# Patient Record
Sex: MALE | Race: WHITE | NOT HISPANIC OR LATINO | Employment: OTHER | URBAN - METROPOLITAN AREA
[De-identification: names, ages, dates, MRNs, and addresses within clinical notes are randomized per-mention and may not be internally consistent; named-entity substitution may affect disease eponyms.]

---

## 2017-01-01 ENCOUNTER — TELEPHONE (OUTPATIENT)
Dept: INTERNAL MEDICINE | Facility: CLINIC | Age: 72
End: 2017-01-01

## 2017-01-01 ENCOUNTER — OFFICE VISIT (OUTPATIENT)
Dept: INTERNAL MEDICINE | Facility: CLINIC | Age: 72
End: 2017-01-01

## 2017-01-01 ENCOUNTER — HOSPITAL ENCOUNTER (INPATIENT)
Facility: HOSPITAL | Age: 72
LOS: 1 days | Discharge: HOME | End: 2017-04-01
Attending: EMERGENCY MEDICINE | Admitting: HOSPITALIST

## 2017-01-01 ENCOUNTER — HOSPITAL ENCOUNTER (OUTPATIENT)
Dept: CARDIOLOGY | Facility: HOSPITAL | Age: 72
Discharge: HOME | End: 2017-04-04
Admitting: INTERNAL MEDICINE

## 2017-01-01 ENCOUNTER — LAB (OUTPATIENT)
Dept: LAB | Facility: HOSPITAL | Age: 72
End: 2017-01-01

## 2017-01-01 VITALS
HEIGHT: 74 IN | SYSTOLIC BLOOD PRESSURE: 116 MMHG | RESPIRATION RATE: 18 BRPM | OXYGEN SATURATION: 98 % | DIASTOLIC BLOOD PRESSURE: 60 MMHG | WEIGHT: 255 LBS | HEART RATE: 97 BPM | BODY MASS INDEX: 32.73 KG/M2 | TEMPERATURE: 98.5 F

## 2017-01-01 VITALS
OXYGEN SATURATION: 98 % | HEIGHT: 74 IN | SYSTOLIC BLOOD PRESSURE: 122 MMHG | DIASTOLIC BLOOD PRESSURE: 72 MMHG | WEIGHT: 255 LBS | HEART RATE: 91 BPM | BODY MASS INDEX: 32.73 KG/M2 | RESPIRATION RATE: 18 BRPM

## 2017-01-01 VITALS
SYSTOLIC BLOOD PRESSURE: 115 MMHG | HEIGHT: 74 IN | BODY MASS INDEX: 32.71 KG/M2 | RESPIRATION RATE: 18 BRPM | TEMPERATURE: 98 F | HEART RATE: 62 BPM | WEIGHT: 254.9 LBS | DIASTOLIC BLOOD PRESSURE: 75 MMHG | OXYGEN SATURATION: 98 %

## 2017-01-01 DIAGNOSIS — M25.472 LEFT ANKLE SWELLING: ICD-10-CM

## 2017-01-01 DIAGNOSIS — M25.472 LEFT ANKLE SWELLING: Primary | ICD-10-CM

## 2017-01-01 DIAGNOSIS — R50.9 FEVER AND CHILLS: Primary | ICD-10-CM

## 2017-01-01 DIAGNOSIS — L60.8 NAIL PITTING: ICD-10-CM

## 2017-01-01 DIAGNOSIS — I10 ESSENTIAL HYPERTENSION: ICD-10-CM

## 2017-01-01 DIAGNOSIS — Z23 NEED FOR PNEUMOCOCCAL VACCINATION: ICD-10-CM

## 2017-01-01 DIAGNOSIS — R79.89 ELEVATED SERUM CREATININE: ICD-10-CM

## 2017-01-01 DIAGNOSIS — R21 RASH: ICD-10-CM

## 2017-01-01 DIAGNOSIS — D64.9 SYMPTOMATIC ANEMIA: Primary | ICD-10-CM

## 2017-01-01 DIAGNOSIS — M79.605 LEFT LEG PAIN: Primary | ICD-10-CM

## 2017-01-01 DIAGNOSIS — I87.2 VENOUS STASIS DERMATITIS OF BOTH LOWER EXTREMITIES: ICD-10-CM

## 2017-01-01 DIAGNOSIS — K12.1 ULCERATION OF ORAL MUCOSA: ICD-10-CM

## 2017-01-01 DIAGNOSIS — M1A.9XX0 CHRONIC GOUT WITHOUT TOPHUS, UNSPECIFIED CAUSE, UNSPECIFIED SITE: ICD-10-CM

## 2017-01-01 DIAGNOSIS — E79.0 ELEVATED URIC ACID IN BLOOD: ICD-10-CM

## 2017-01-01 DIAGNOSIS — K12.1 ULCERATION, ORAL MUCOSA: ICD-10-CM

## 2017-01-01 DIAGNOSIS — R50.9 FEVER CHILLS: Primary | ICD-10-CM

## 2017-01-01 DIAGNOSIS — Z11.59 NEED FOR HEPATITIS C SCREENING TEST: ICD-10-CM

## 2017-01-01 DIAGNOSIS — M1A.9XX0 CHRONIC GOUT WITHOUT TOPHUS, UNSPECIFIED CAUSE, UNSPECIFIED SITE: Primary | ICD-10-CM

## 2017-01-01 DIAGNOSIS — R73.02 IMPAIRED GLUCOSE TOLERANCE: ICD-10-CM

## 2017-01-01 LAB
ABO + RH BLD: NORMAL
ABO GROUP BLD: NORMAL
ALBUMIN SERPL-MCNC: 3.8 G/DL (ref 3.5–5.2)
ALBUMIN SERPL-MCNC: 4.4 G/DL (ref 3.5–5.2)
ALBUMIN SERPL-MCNC: 4.9 G/DL (ref 3.5–5.2)
ALBUMIN/GLOB SERPL: 1.3 G/DL
ALBUMIN/GLOB SERPL: 1.4 G/DL
ALBUMIN/GLOB SERPL: 2 G/DL
ALP SERPL-CCNC: 65 U/L (ref 39–117)
ALP SERPL-CCNC: 67 U/L (ref 39–117)
ALP SERPL-CCNC: 76 U/L (ref 39–117)
ALT SERPL W P-5'-P-CCNC: 12 U/L (ref 1–41)
ALT SERPL W P-5'-P-CCNC: 8 U/L (ref 1–41)
ALT SERPL-CCNC: 13 U/L (ref 1–41)
ANA SER QL: NEGATIVE
ANION GAP SERPL CALCULATED.3IONS-SCNC: 15.6 MMOL/L
ANION GAP SERPL CALCULATED.3IONS-SCNC: 16.2 MMOL/L
ANISOCYTOSIS BLD QL: ABNORMAL
ANISOCYTOSIS BLD QL: ABNORMAL
ANISOCYTOSIS BLD QL: NORMAL
AST SERPL-CCNC: 12 U/L (ref 1–40)
AST SERPL-CCNC: 13 U/L (ref 1–40)
AST SERPL-CCNC: 16 U/L (ref 1–40)
BACTERIA SPEC AEROBE CULT: NORMAL
BACTERIA SPEC AEROBE CULT: NORMAL
BASOPHILS # BLD AUTO: 0 10*3/MM3 (ref 0–0.2)
BASOPHILS NFR BLD AUTO: 0 % (ref 0–1.5)
BH BB BLOOD EXPIRATION DATE: NORMAL
BH BB BLOOD TYPE BARCODE: 600
BH BB BLOOD TYPE BARCODE: 6200
BH BB DISPENSE STATUS: NORMAL
BH BB PRODUCT CODE: NORMAL
BH BB UNIT NUMBER: NORMAL
BH CV LOWER VASCULAR LEFT COMMON FEMORAL AUGMENT: NORMAL
BH CV LOWER VASCULAR LEFT COMMON FEMORAL COMPETENT: NORMAL
BH CV LOWER VASCULAR LEFT COMMON FEMORAL COMPRESS: NORMAL
BH CV LOWER VASCULAR LEFT COMMON FEMORAL PHASIC: NORMAL
BH CV LOWER VASCULAR LEFT COMMON FEMORAL SPONT: NORMAL
BH CV LOWER VASCULAR LEFT DISTAL FEMORAL COMPRESS: NORMAL
BH CV LOWER VASCULAR LEFT GASTRONEMIUS COMPRESS: NORMAL
BH CV LOWER VASCULAR LEFT GREATER SAPH AK COMPRESS: NORMAL
BH CV LOWER VASCULAR LEFT GREATER SAPH BK COMPRESS: NORMAL
BH CV LOWER VASCULAR LEFT MID FEMORAL AUGMENT: NORMAL
BH CV LOWER VASCULAR LEFT MID FEMORAL COMPETENT: NORMAL
BH CV LOWER VASCULAR LEFT MID FEMORAL COMPRESS: NORMAL
BH CV LOWER VASCULAR LEFT MID FEMORAL PHASIC: NORMAL
BH CV LOWER VASCULAR LEFT MID FEMORAL SPONT: NORMAL
BH CV LOWER VASCULAR LEFT PERONEAL COMPRESS: NORMAL
BH CV LOWER VASCULAR LEFT POPLITEAL AUGMENT: NORMAL
BH CV LOWER VASCULAR LEFT POPLITEAL COMPETENT: NORMAL
BH CV LOWER VASCULAR LEFT POPLITEAL COMPRESS: NORMAL
BH CV LOWER VASCULAR LEFT POPLITEAL PHASIC: NORMAL
BH CV LOWER VASCULAR LEFT POPLITEAL SPONT: NORMAL
BH CV LOWER VASCULAR LEFT POSTERIOR TIBIAL COMPRESS: NORMAL
BH CV LOWER VASCULAR LEFT PROXIMAL FEMORAL COMPRESS: NORMAL
BH CV LOWER VASCULAR LEFT SAPHENOFEMORAL JUNCTION AUGMENT: NORMAL
BH CV LOWER VASCULAR LEFT SAPHENOFEMORAL JUNCTION COMPETENT: NORMAL
BH CV LOWER VASCULAR LEFT SAPHENOFEMORAL JUNCTION COMPRESS: NORMAL
BH CV LOWER VASCULAR LEFT SAPHENOFEMORAL JUNCTION PHASIC: NORMAL
BH CV LOWER VASCULAR LEFT SAPHENOFEMORAL JUNCTION SPONT: NORMAL
BH CV LOWER VASCULAR RIGHT COMMON FEMORAL AUGMENT: NORMAL
BH CV LOWER VASCULAR RIGHT COMMON FEMORAL COMPETENT: NORMAL
BH CV LOWER VASCULAR RIGHT COMMON FEMORAL COMPRESS: NORMAL
BH CV LOWER VASCULAR RIGHT COMMON FEMORAL PHASIC: NORMAL
BH CV LOWER VASCULAR RIGHT COMMON FEMORAL SPONT: NORMAL
BH CV POP FLUID COLLECT LEFT: 1
BILIRUB SERPL-MCNC: 0.4 MG/DL (ref 0.1–1.2)
BILIRUB SERPL-MCNC: 0.5 MG/DL (ref 0.1–1.2)
BILIRUB SERPL-MCNC: 0.6 MG/DL (ref 0.1–1.2)
BILIRUB UR QL STRIP: NEGATIVE
BLASTS NFR BLD MANUAL: 2 % (ref 0–0)
BLASTS NFR BLD MANUAL: 3 % (ref 0–0)
BLD GP AB SCN SERPL QL: NEGATIVE
BUN BLD-MCNC: 16 MG/DL (ref 8–23)
BUN BLD-MCNC: 17 MG/DL (ref 8–23)
BUN SERPL-MCNC: 15 MG/DL (ref 8–23)
BUN SERPL-MCNC: 22 MG/DL (ref 8–23)
BUN/CREAT SERPL: 12.5 (ref 7–25)
BUN/CREAT SERPL: 12.8 (ref 7–25)
BUN/CREAT SERPL: 13.7 (ref 7–25)
BUN/CREAT SERPL: 16.7 (ref 7–25)
C3 SERPL-MCNC: 158 MG/DL (ref 82–167)
C4 SERPL-MCNC: 37 MG/DL (ref 14–44)
CALCIUM SERPL-MCNC: 9.6 MG/DL (ref 8.6–10.5)
CALCIUM SERPL-MCNC: 9.7 MG/DL (ref 8.6–10.5)
CALCIUM SPEC-SCNC: 8.8 MG/DL (ref 8.6–10.5)
CALCIUM SPEC-SCNC: 9.4 MG/DL (ref 8.6–10.5)
CHLORIDE SERPL-SCNC: 100 MMOL/L (ref 98–107)
CHLORIDE SERPL-SCNC: 100 MMOL/L (ref 98–107)
CHLORIDE SERPL-SCNC: 103 MMOL/L (ref 98–107)
CHLORIDE SERPL-SCNC: 103 MMOL/L (ref 98–107)
CHOLEST SERPL-MCNC: 131 MG/DL (ref 0–200)
CHROMATIN AB SERPL-ACNC: <10 IU/ML (ref 0–14)
CLARITY UR: CLEAR
CO2 SERPL-SCNC: 21.4 MMOL/L (ref 22–29)
CO2 SERPL-SCNC: 21.6 MMOL/L (ref 22–29)
CO2 SERPL-SCNC: 22.6 MMOL/L (ref 22–29)
CO2 SERPL-SCNC: 22.8 MMOL/L (ref 22–29)
COLOR UR: YELLOW
CREAT BLD-MCNC: 1.24 MG/DL (ref 0.76–1.27)
CREAT BLD-MCNC: 1.25 MG/DL (ref 0.76–1.27)
CREAT SERPL-MCNC: 1.2 MG/DL (ref 0.76–1.27)
CREAT SERPL-MCNC: 1.32 MG/DL (ref 0.76–1.27)
CRP SERPL-MCNC: 2.04 MG/DL (ref 0–0.5)
DACRYOCYTES BLD QL SMEAR: ABNORMAL
DAT POLY-SP REAG RBC QL: NEGATIVE
DEPRECATED RDW RBC AUTO: 80.5 FL (ref 37–54)
DEPRECATED RDW RBC AUTO: 80.6 FL (ref 37–54)
DEPRECATED RDW RBC AUTO: 92.4 FL (ref 37–54)
EOSINOPHIL # BLD AUTO: 0 10*3/MM3 (ref 0–0.7)
EOSINOPHIL # BLD MANUAL: 0.06 10*3/MM3 (ref 0–0.7)
EOSINOPHIL # BLD MANUAL: 0.07 10*3/MM3 (ref 0–0.7)
EOSINOPHIL NFR BLD AUTO: 0 % (ref 0.3–6.2)
EOSINOPHIL NFR BLD MANUAL: 1 % (ref 0.3–6.2)
EOSINOPHIL NFR BLD MANUAL: 1 % (ref 0.3–6.2)
ERYTHROCYTE [DISTWIDTH] IN BLOOD BY AUTOMATED COUNT: 20.1 % (ref 11.5–14.5)
ERYTHROCYTE [DISTWIDTH] IN BLOOD BY AUTOMATED COUNT: 20.2 % (ref 11.5–14.5)
ERYTHROCYTE [DISTWIDTH] IN BLOOD BY AUTOMATED COUNT: 24.4 % (ref 11.5–14.5)
ERYTHROCYTE [SEDIMENTATION RATE] IN BLOOD: >140 MM/HR (ref 0–20)
ERYTHROCYTE [SEDIMENTATION RATE] IN BLOOD: >140 MM/HR (ref 0–20)
FERRITIN SERPL-MCNC: 683.1 NG/ML (ref 30–400)
FOLATE SERPL-MCNC: 16.25 NG/ML (ref 4.78–24.2)
GFR SERPL CREATININE-BSD FRML MDRD: 57 ML/MIN/1.73
GFR SERPL CREATININE-BSD FRML MDRD: 57 ML/MIN/1.73
GLOBULIN SER CALC-MCNC: 2.5 GM/DL
GLOBULIN UR ELPH-MCNC: 2.8 GM/DL
GLOBULIN UR ELPH-MCNC: 3.5 GM/DL
GLUCOSE BLD-MCNC: 104 MG/DL (ref 65–99)
GLUCOSE BLD-MCNC: 95 MG/DL (ref 65–99)
GLUCOSE SERPL-MCNC: 128 MG/DL (ref 65–99)
GLUCOSE SERPL-MCNC: 85 MG/DL (ref 65–99)
GLUCOSE UR STRIP-MCNC: NEGATIVE MG/DL
HAPTOGLOB SERPL-MCNC: 313 MG/DL (ref 34–200)
HBA1C MFR BLD: 6.1 % (ref 4.8–5.6)
HCT VFR BLD AUTO: (no result) %
HCT VFR BLD AUTO: 17.3 % (ref 40.4–52.2)
HCT VFR BLD AUTO: 18.6 % (ref 40.4–52.2)
HCT VFR BLD AUTO: 20 % (ref 40.4–52.2)
HCT VFR BLD AUTO: 25.8 % (ref 40.4–52.2)
HCV AB S/CO SERPL IA: <0.1 S/CO RATIO (ref 0–0.9)
HDLC SERPL-MCNC: 30 MG/DL (ref 40–60)
HGB BLD-MCNC: (no result) G/DL
HGB BLD-MCNC: 5.7 G/DL (ref 13.7–17.6)
HGB BLD-MCNC: 6 G/DL (ref 13.7–17.6)
HGB BLD-MCNC: 6.5 G/DL (ref 13.7–17.6)
HGB BLD-MCNC: 8.8 G/DL (ref 13.7–17.6)
HGB UR QL STRIP.AUTO: NEGATIVE
HOWELL-JOLLY BOD BLD QL SMEAR: PRESENT
HOWELL-JOLLY BOD BLD QL SMEAR: PRESENT
HYPOCHROMIA BLD QL: ABNORMAL
IMM GRANULOCYTES # BLD: 0.02 10*3/MM3 (ref 0–0.03)
IMM GRANULOCYTES NFR BLD: 0.4 % (ref 0–0.5)
IRON 24H UR-MRATE: 103 MCG/DL (ref 59–158)
IRON SATN MFR SERPL: 31 % (ref 20–50)
KETONES UR QL STRIP: NEGATIVE
LARGE PLATELETS: ABNORMAL
LDH SERPL-CCNC: 467 U/L (ref 135–225)
LDLC SERPL CALC-MCNC: 78 MG/DL (ref 0–100)
LEUKOCYTE ESTERASE UR QL STRIP.AUTO: NEGATIVE
LYMPHOCYTES # BLD AUTO: 1.39 10*3/MM3 (ref 0.9–4.8)
LYMPHOCYTES # BLD MANUAL: 0.99 10*3/MM3 (ref 0.9–4.8)
LYMPHOCYTES # BLD MANUAL: 1.48 10*3/MM3 (ref 0.9–4.8)
LYMPHOCYTES NFR BLD AUTO: 30.8 % (ref 19.6–45.3)
LYMPHOCYTES NFR BLD MANUAL: 16 % (ref 19.6–45.3)
LYMPHOCYTES NFR BLD MANUAL: 20 % (ref 19.6–45.3)
LYMPHOCYTES NFR BLD MANUAL: 46 % (ref 5–12)
LYMPHOCYTES NFR BLD MANUAL: 53 % (ref 5–12)
Lab: NORMAL
MACROCYTES BLD QL SMEAR: NORMAL
MCH RBC QN AUTO: 34.8 PG (ref 27–32.7)
MCH RBC QN AUTO: 35.7 PG (ref 27–32.7)
MCH RBC QN AUTO: 36.3 PG (ref 27–32.7)
MCHC RBC AUTO-ENTMCNC: 32.3 G/DL (ref 32.6–36.4)
MCHC RBC AUTO-ENTMCNC: 32.5 G/DL (ref 32.6–36.4)
MCHC RBC AUTO-ENTMCNC: 32.9 G/DL (ref 32.6–36.4)
MCV RBC AUTO: 107 FL (ref 79.8–96.2)
MCV RBC AUTO: 110.2 FL (ref 79.8–96.2)
MCV RBC AUTO: 110.7 FL (ref 79.8–96.2)
MONOCYTES # BLD AUTO: 2.6 10*3/MM3 (ref 0.2–1.2)
MONOCYTES # BLD AUTO: 3.29 10*3/MM3 (ref 0.2–1.2)
MONOCYTES # BLD AUTO: 3.4 10*3/MM3 (ref 0.2–1.2)
MONOCYTES NFR BLD AUTO: 57.6 % (ref 5–12)
MYELOCYTES NFR BLD MANUAL: 1 % (ref 0–0)
NEUTROPHILS # BLD AUTO: 0.5 10*3/MM3 (ref 1.9–8.1)
NEUTROPHILS # BLD AUTO: 1.61 10*3/MM3 (ref 1.9–8.1)
NEUTROPHILS # BLD AUTO: 2.22 10*3/MM3 (ref 1.9–8.1)
NEUTROPHILS NFR BLD AUTO: 11.2 % (ref 42.7–76)
NEUTROPHILS NFR BLD MANUAL: 26 % (ref 42.7–76)
NEUTROPHILS NFR BLD MANUAL: 30 % (ref 42.7–76)
NITRITE UR QL STRIP: NEGATIVE
OVALOCYTES BLD QL SMEAR: ABNORMAL
PH UR STRIP.AUTO: <=5 [PH] (ref 5–8)
PLAT MORPH BLD: NORMAL
PLAT MORPH BLD: NORMAL
PLATELET # BLD AUTO: (no result) 10*3/UL
PLATELET # BLD AUTO: 73 10*3/MM3 (ref 140–500)
PLATELET # BLD AUTO: 85 10*3/MM3 (ref 140–500)
PLATELET # BLD AUTO: 98 10*3/MM3 (ref 140–500)
PMV BLD AUTO: 11.3 FL (ref 6–12)
PMV BLD AUTO: 11.6 FL (ref 6–12)
PMV BLD AUTO: 11.6 FL (ref 6–12)
POLYCHROMASIA BLD QL SMEAR: ABNORMAL
POLYCHROMASIA BLD QL SMEAR: ABNORMAL
POTASSIUM BLD-SCNC: 4.2 MMOL/L (ref 3.5–5.2)
POTASSIUM BLD-SCNC: 4.3 MMOL/L (ref 3.5–5.2)
POTASSIUM SERPL-SCNC: 4.5 MMOL/L (ref 3.5–5.2)
POTASSIUM SERPL-SCNC: 4.6 MMOL/L (ref 3.5–5.2)
PROT SERPL-MCNC: 6.6 G/DL (ref 6–8.5)
PROT SERPL-MCNC: 7.4 G/DL (ref 6–8.5)
PROT SERPL-MCNC: 7.9 G/DL (ref 6–8.5)
PROT UR QL STRIP: NEGATIVE
RBC # BLD AUTO: (no result) 10*6/UL
RBC # BLD AUTO: 1.57 10*6/MM3 (ref 4.6–6)
RBC # BLD AUTO: 1.68 10*6/MM3 (ref 4.6–6)
RBC # BLD AUTO: 1.87 10*6/MM3 (ref 4.6–6)
REQUEST PROBLEM: NORMAL
RETICS/RBC NFR AUTO: 3.27 % (ref 0.5–1.5)
RH BLD: POSITIVE
SCAN SLIDE: NORMAL
SCAN SLIDE: NORMAL
SODIUM BLD-SCNC: 139 MMOL/L (ref 136–145)
SODIUM BLD-SCNC: 140 MMOL/L (ref 136–145)
SODIUM SERPL-SCNC: 140 MMOL/L (ref 136–145)
SODIUM SERPL-SCNC: 141 MMOL/L (ref 136–145)
SP GR UR STRIP: 1.02 (ref 1–1.03)
SPHEROCYTES BLD QL SMEAR: ABNORMAL
SPHEROCYTES BLD QL SMEAR: ABNORMAL
STOMATOCYTES BLD QL SMEAR: ABNORMAL
TIBC SERPL-MCNC: 334 MCG/DL (ref 298–536)
TRANSFERRIN SERPL-MCNC: 224 MG/DL (ref 200–360)
TRIGL SERPL-MCNC: 115 MG/DL (ref 0–150)
TSH SERPL DL<=0.05 MIU/L-ACNC: 2.78 MIU/ML (ref 0.27–4.2)
TSH SERPL DL<=0.05 MIU/L-ACNC: 3.39 MIU/ML (ref 0.27–4.2)
UNIT  ABO: NORMAL
UNIT  RH: NORMAL
URATE SERPL-MCNC: 5.7 MG/DL (ref 3.4–7)
URATE SERPL-MCNC: 6.8 MG/DL (ref 3.4–7)
URATE SERPL-MCNC: 8.3 MG/DL (ref 3.4–7)
UROBILINOGEN UR QL STRIP: NORMAL
VARIANT LYMPHS NFR BLD MANUAL: 1 % (ref 0–5)
VIT B12 BLD-MCNC: 328 PG/ML (ref 211–946)
VLDLC SERPL CALC-MCNC: 23 MG/DL (ref 5–40)
WBC # BLD AUTO: (no result) 10*3/MM3
WBC MORPH BLD: NORMAL
WBC NRBC COR # BLD: 4.51 10*3/MM3 (ref 4.5–10.7)
WBC NRBC COR # BLD: 6.2 10*3/MM3 (ref 4.5–10.7)
WBC NRBC COR # BLD: 7.4 10*3/MM3 (ref 4.5–10.7)

## 2017-01-01 PROCEDURE — 85007 BL SMEAR W/DIFF WBC COUNT: CPT | Performed by: EMERGENCY MEDICINE

## 2017-01-01 PROCEDURE — 83010 ASSAY OF HAPTOGLOBIN QUANT: CPT | Performed by: INTERNAL MEDICINE

## 2017-01-01 PROCEDURE — 86923 COMPATIBILITY TEST ELECTRIC: CPT

## 2017-01-01 PROCEDURE — 99214 OFFICE O/P EST MOD 30 MIN: CPT | Performed by: INTERNAL MEDICINE

## 2017-01-01 PROCEDURE — 36430 TRANSFUSION BLD/BLD COMPNT: CPT

## 2017-01-01 PROCEDURE — 81003 URINALYSIS AUTO W/O SCOPE: CPT | Performed by: HOSPITALIST

## 2017-01-01 PROCEDURE — 85045 AUTOMATED RETICULOCYTE COUNT: CPT | Performed by: HOSPITALIST

## 2017-01-01 PROCEDURE — 85018 HEMOGLOBIN: CPT | Performed by: INTERNAL MEDICINE

## 2017-01-01 PROCEDURE — 82607 VITAMIN B-12: CPT | Performed by: HOSPITALIST

## 2017-01-01 PROCEDURE — 85652 RBC SED RATE AUTOMATED: CPT | Performed by: HOSPITALIST

## 2017-01-01 PROCEDURE — 85025 COMPLETE CBC W/AUTO DIFF WBC: CPT | Performed by: HOSPITALIST

## 2017-01-01 PROCEDURE — P9016 RBC LEUKOCYTES REDUCED: HCPCS

## 2017-01-01 PROCEDURE — 30233N1 TRANSFUSION OF NONAUTOLOGOUS RED BLOOD CELLS INTO PERIPHERAL VEIN, PERCUTANEOUS APPROACH: ICD-10-PCS | Performed by: INTERNAL MEDICINE

## 2017-01-01 PROCEDURE — 86900 BLOOD TYPING SEROLOGIC ABO: CPT

## 2017-01-01 PROCEDURE — 84466 ASSAY OF TRANSFERRIN: CPT | Performed by: EMERGENCY MEDICINE

## 2017-01-01 PROCEDURE — 86850 RBC ANTIBODY SCREEN: CPT | Performed by: EMERGENCY MEDICINE

## 2017-01-01 PROCEDURE — 93971 EXTREMITY STUDY: CPT

## 2017-01-01 PROCEDURE — 83540 ASSAY OF IRON: CPT | Performed by: EMERGENCY MEDICINE

## 2017-01-01 PROCEDURE — 90732 PPSV23 VACC 2 YRS+ SUBQ/IM: CPT | Performed by: INTERNAL MEDICINE

## 2017-01-01 PROCEDURE — 30233N1 TRANSFUSION OF NONAUTOLOGOUS RED BLOOD CELLS INTO PERIPHERAL VEIN, PERCUTANEOUS APPROACH: ICD-10-PCS | Performed by: EMERGENCY MEDICINE

## 2017-01-01 PROCEDURE — 99284 EMERGENCY DEPT VISIT MOD MDM: CPT

## 2017-01-01 PROCEDURE — 85014 HEMATOCRIT: CPT | Performed by: INTERNAL MEDICINE

## 2017-01-01 PROCEDURE — 85007 BL SMEAR W/DIFF WBC COUNT: CPT | Performed by: HOSPITALIST

## 2017-01-01 PROCEDURE — 84443 ASSAY THYROID STIM HORMONE: CPT | Performed by: HOSPITALIST

## 2017-01-01 PROCEDURE — 82728 ASSAY OF FERRITIN: CPT | Performed by: EMERGENCY MEDICINE

## 2017-01-01 PROCEDURE — 82746 ASSAY OF FOLIC ACID SERUM: CPT | Performed by: HOSPITALIST

## 2017-01-01 PROCEDURE — 86900 BLOOD TYPING SEROLOGIC ABO: CPT | Performed by: EMERGENCY MEDICINE

## 2017-01-01 PROCEDURE — 86901 BLOOD TYPING SEROLOGIC RH(D): CPT | Performed by: EMERGENCY MEDICINE

## 2017-01-01 PROCEDURE — 83615 LACTATE (LD) (LDH) ENZYME: CPT | Performed by: HOSPITALIST

## 2017-01-01 PROCEDURE — G0009 ADMIN PNEUMOCOCCAL VACCINE: HCPCS | Performed by: INTERNAL MEDICINE

## 2017-01-01 PROCEDURE — 86901 BLOOD TYPING SEROLOGIC RH(D): CPT

## 2017-01-01 PROCEDURE — 86880 COOMBS TEST DIRECT: CPT | Performed by: INTERNAL MEDICINE

## 2017-01-01 PROCEDURE — 80053 COMPREHEN METABOLIC PANEL: CPT | Performed by: HOSPITALIST

## 2017-01-01 PROCEDURE — 85025 COMPLETE CBC W/AUTO DIFF WBC: CPT | Performed by: EMERGENCY MEDICINE

## 2017-01-01 PROCEDURE — 99223 1ST HOSP IP/OBS HIGH 75: CPT | Performed by: INTERNAL MEDICINE

## 2017-01-01 RX ORDER — ONDANSETRON 4 MG/1
4 TABLET, ORALLY DISINTEGRATING ORAL EVERY 6 HOURS PRN
Status: DISCONTINUED | OUTPATIENT
Start: 2017-01-01 | End: 2017-01-01 | Stop reason: HOSPADM

## 2017-01-01 RX ORDER — ALLOPURINOL 100 MG/1
100 TABLET ORAL DAILY
Status: DISCONTINUED | OUTPATIENT
Start: 2017-01-01 | End: 2017-01-01 | Stop reason: HOSPADM

## 2017-01-01 RX ORDER — ONDANSETRON 4 MG/1
4 TABLET, FILM COATED ORAL EVERY 6 HOURS PRN
Status: DISCONTINUED | OUTPATIENT
Start: 2017-01-01 | End: 2017-01-01 | Stop reason: HOSPADM

## 2017-01-01 RX ORDER — SODIUM CHLORIDE 0.9 % (FLUSH) 0.9 %
1-10 SYRINGE (ML) INJECTION AS NEEDED
Status: DISCONTINUED | OUTPATIENT
Start: 2017-01-01 | End: 2017-01-01 | Stop reason: HOSPADM

## 2017-01-01 RX ORDER — ACETAMINOPHEN 325 MG/1
650 TABLET ORAL EVERY 4 HOURS PRN
Status: DISCONTINUED | OUTPATIENT
Start: 2017-01-01 | End: 2017-01-01 | Stop reason: HOSPADM

## 2017-01-01 RX ORDER — INDOMETHACIN 50 MG/1
50 CAPSULE ORAL 3 TIMES DAILY PRN
Qty: 15 CAPSULE | Refills: 0 | Status: ON HOLD | OUTPATIENT
Start: 2017-01-01 | End: 2017-01-01

## 2017-01-01 RX ORDER — PANTOPRAZOLE SODIUM 40 MG/10ML
40 INJECTION, POWDER, LYOPHILIZED, FOR SOLUTION INTRAVENOUS EVERY 12 HOURS SCHEDULED
Status: DISCONTINUED | OUTPATIENT
Start: 2017-01-01 | End: 2017-01-01 | Stop reason: HOSPADM

## 2017-01-01 RX ORDER — SODIUM CHLORIDE 0.9 % (FLUSH) 0.9 %
10 SYRINGE (ML) INJECTION AS NEEDED
Status: DISCONTINUED | OUTPATIENT
Start: 2017-01-01 | End: 2017-01-01 | Stop reason: HOSPADM

## 2017-01-01 RX ORDER — LOSARTAN POTASSIUM 50 MG/1
50 TABLET ORAL DAILY
Status: DISCONTINUED | OUTPATIENT
Start: 2017-01-01 | End: 2017-01-01 | Stop reason: HOSPADM

## 2017-01-01 RX ORDER — MELATONIN
1000 DAILY
Status: DISCONTINUED | OUTPATIENT
Start: 2017-01-01 | End: 2017-01-01 | Stop reason: HOSPADM

## 2017-01-01 RX ORDER — ONDANSETRON 2 MG/ML
4 INJECTION INTRAMUSCULAR; INTRAVENOUS EVERY 6 HOURS PRN
Status: DISCONTINUED | OUTPATIENT
Start: 2017-01-01 | End: 2017-01-01 | Stop reason: HOSPADM

## 2017-01-01 RX ORDER — ALLOPURINOL 100 MG/1
200 TABLET ORAL DAILY
Status: DISCONTINUED | OUTPATIENT
Start: 2017-01-01 | End: 2017-01-01

## 2017-01-01 RX ORDER — ALLOPURINOL 100 MG/1
200 TABLET ORAL DAILY
Qty: 60 TABLET | Refills: 1 | Status: SHIPPED | OUTPATIENT
Start: 2017-01-01

## 2017-01-01 RX ADMIN — ALLOPURINOL 100 MG: 100 TABLET ORAL at 10:10

## 2017-01-01 RX ADMIN — VITAMIN D, TAB 1000IU (100/BT) 1000 UNITS: 25 TAB at 10:09

## 2017-01-01 RX ADMIN — LOSARTAN POTASSIUM 50 MG: 50 TABLET, FILM COATED ORAL at 10:08

## 2017-01-01 RX ADMIN — PANTOPRAZOLE SODIUM 40 MG: 40 INJECTION, POWDER, FOR SOLUTION INTRAVENOUS at 03:22

## 2017-01-04 NOTE — TELEPHONE ENCOUNTER
Is taking the allopurinol, but had a lot of seafood over thanksgiving and emily, because his family doesn't eat turkey, chicken, or beef. Did not know that would cause his gout to act up.     Pt wants to know if we can send in something extra to help with the flare up he is having.

## 2017-01-04 NOTE — TELEPHONE ENCOUNTER
Diets high in animal protein can increase gout.  Will send in indomethacin.      He is overdue for labs and follow up.

## 2017-01-04 NOTE — TELEPHONE ENCOUNTER
Called and informed pt. Told me he goes to a different [facility], called pharmacy and took care of that. Changed pharmacy in pt's chart.     Also informed pt that a f/u with labs needed to be scheduled since he is way past due. Pt stated that they would call back and schedule this.

## 2017-02-13 NOTE — PROGRESS NOTES
Chief Complaint  Remy Bryant is a 71 y.o. male who presents for Ankle Pain (Left ankle)  .    History of Present Illness   His left calf and his ankle started hurting 2-3 days after goldy dinner.  He had eaten a very rich Goldy dinner and he was wondering if this was related to his gout.  He didn't have any known injury to his ankle.  No long car or plane rides.  He told people he twisted it because he didn't want to own up to having gout.    We had called in indomethacin and it helped within the first 24 hours.  He stays very active for his age.  He feels like the ankle is almost 100% but still has some residual ache.      HTN - no cp or palp or edema or dizziness.     PreDM - he is overdue for labs.      Review of Systems   Constitutional: Negative for chills, fatigue and fever.   Musculoskeletal: Positive for gait problem (only when his ankle bothers him). Negative for arthralgias, joint swelling and myalgias.   Skin: Negative for color change and rash.   Neurological: Negative for dizziness and light-headedness.       Patient Active Problem List   Diagnosis   • Increased glucose level   • Gout   • Hypertension   • Shoulder joint pain   • Impaired glucose tolerance       Past Medical History   Diagnosis Date   • Disease of thyroid gland    • Gout    • Hypertension    • IFG (impaired fasting glucose)    • Nephrolithiasis    • Obesity    • Polyp of sigmoid colon    • Prediabetes    • Renal insufficiency syndrome        Past Surgical History   Procedure Laterality Date   • Appendectomy     • Eye surgery     • Total hip arthroplasty         Family History   Problem Relation Age of Onset   • Coronary artery disease Mother    • Stroke Mother    • Heart attack Father    • Asthma Sister    • Diabetes Paternal Grandmother        Social History     Social History   • Marital status:      Spouse name: N/A   • Number of children: N/A   • Years of education: N/A     Occupational History   • Not on file.  "    Social History Main Topics   • Smoking status: Never Smoker   • Smokeless tobacco: Not on file   • Alcohol use Yes   • Drug use: No   • Sexual activity: Not on file     Other Topics Concern   • Not on file     Social History Narrative       Current Outpatient Prescriptions on File Prior to Visit   Medication Sig Dispense Refill   • allopurinol (ZYLOPRIM) 100 MG tablet Take 1 tablet by mouth Daily. 90 tablet 2   • indomethacin (INDOCIN) 50 MG capsule Take 1 capsule by mouth 3 (Three) Times a Day As Needed for mild pain (1-3). 15 capsule 0   • losartan (COZAAR) 50 MG tablet Take 1 tablet by mouth Daily. Indications: Was sent in via EHR 90 tablet 3     No current facility-administered medications on file prior to visit.        No Known Allergies    Vitals:    02/13/17 1122   BP: 122/72   Pulse: 91   Resp: 18   SpO2: 98%   Weight: 255 lb (116 kg)   Height: 74\" (188 cm)       Body mass index is 32.74 kg/(m^2).    Objective   Physical Exam   Constitutional: He is oriented to person, place, and time. He appears well-developed and well-nourished. No distress.   HENT:   Head: Normocephalic and atraumatic.   Eyes: Conjunctivae are normal. No scleral icterus.   Neck: Normal range of motion. Neck supple.   Cardiovascular: Normal rate, regular rhythm and normal heart sounds.  Exam reveals no gallop and no friction rub.    No murmur heard.  Pulmonary/Chest: Effort normal and breath sounds normal. No respiratory distress. He has no wheezes. He has no rales.   Musculoskeletal: Normal range of motion. He exhibits no edema.        Left ankle: He exhibits normal range of motion, no swelling and no deformity. No tenderness. Achilles tendon exhibits no pain.        Left lower leg: He exhibits no tenderness, no bony tenderness, no swelling and no edema.   Lymphadenopathy:     He has no cervical adenopathy.   Neurological: He is alert and oriented to person, place, and time. No cranial nerve deficit.   Skin: No erythema (left calf). "   Psychiatric: He has a normal mood and affect. His behavior is normal. Judgment and thought content normal.       Results for orders placed or performed in visit on 06/01/15   CBC and Differential   Result Value Ref Range    WBC 5.1 3.4 - 10.8 x10E3/uL    RBC 5.25 4.14 - 5.80 x10E6/uL    Hemoglobin 14.5 12.6 - 17.7 g/dL    Hematocrit 42.5 37.5 - 51.0 %    MCV 81 79 - 97 fL    MCH 27.6 26.6 - 33.0 pg    MCHC 34.1 31.5 - 35.7 g/dL    RDW 13.6 12.3 - 15.4 %    Platelets 196 150 - 379 x10E3/uL    Neutrophil Rel % 58 %    Lymphocyte Rel % 30 %    Monocyte Rel % 8 %    Eosinophil Rel % 3 %    Basophil Rel % 1 %    Immature Cells CANCELED     Neutrophils Absolute 3.0 1.4 - 7.0 x10E3/uL    Lymphocytes Absolute 1.6 0.7 - 3.1 x10E3/uL    Monocytes Absolute 0.4 0.1 - 0.9 x10E3/uL    Eosinophils Absolute 0.1 0.0 - 0.4 x10E3/uL    Basophils Absolute 0.1 0.0 - 0.2 x10E3/uL    Immature Granulocyte Rel % 0 %    Immature Grans Absolute 0.0 0.0 - 0.1 x10E3/uL    nRBC CANCELED     Hematology Comments: CANCELED    Comprehensive metabolic panel   Result Value Ref Range    Glucose 120 (H) 65 - 99 mg/dL    BUN 16 8 - 27 mg/dL    Creatinine 1.05 0.76 - 1.27 mg/dL    eGFR Non African Am 72 >59 mL/min/1.73    eGFR African Am 83 >59 mL/min/1.73    BUN/Creatinine Ratio 15 10 - 22    Sodium 139 134 - 144 mmol/L    Potassium 4.7 3.5 - 5.2 mmol/L    Chloride 101 97 - 108 mmol/L    Total CO2 22 18 - 29 mmol/L    Calcium 9.5 8.6 - 10.2 mg/dL    Total Protein 6.8 6.0 - 8.5 g/dL    Albumin 4.6 3.6 - 4.8 g/dL    Globulin 2.2 1.5 - 4.5 g/dL    A/G Ratio 2.1 1.1 - 2.5    Total Bilirubin 0.4 0.0 - 1.2 mg/dL    Alkaline Phosphatase 72 39 - 117 IU/L    AST (SGOT) 17 0 - 40 IU/L    ALT (SGPT) 23 0 - 44 IU/L   UA/M w/rflx Culture, Routine   Result Value Ref Range    Specific Gravity, UA 1.020 1.005 - 1.030    pH, UA 6.0 5.0 - 7.5    Color, UA Yellow Yellow    Appearance, UA Clear Clear    Leukocytes, UA Negative Negative    Protein Negative Negative/Trace     Glucose, UA Negative Negative    Glucose Reflex CANCELED     Ketones Negative Negative    Occult Blood Negative Negative    Bilirubin, UA Negative Negative    Urobilinogen, UA 0.2 0.0 - 1.9 mg/dL    Nitrite, Quantitative, Urine Negative Negative    Microscopic Examination      Microscopic Examination See below:     Urinalysis Reflex     Microscopic Examination   Result Value Ref Range    WBC 0-5 0 - 5 /hpf    RBC, UA 0-2 0 - 2 /hpf    Epithelial Cells (non renal) 0-10 0 - 10 /hpf    Renal Epithel, UA CANCELED     Casts CANCELED     Cast Type CANCELED     Crystals, UA CANCELED     Crystal Type CANCELED     Mucus, UA Present Not Estab.    Bacteria, UA Few None seen/Few    Yeast, UA CANCELED     Trichomonas CANCELED     UA Comment CANCELED    Lipid Panel With LDL/HDL Ratio   Result Value Ref Range    Total Cholesterol 187 100 - 199 mg/dL    Triglycerides 84 0 - 149 mg/dL    HDL Cholesterol 35 (L) >39 mg/dL    VLDL Cholesterol 17 5 - 40 mg/dL    LDL Cholesterol  135 (H) 0 - 99 mg/dL    Comment CANCELED     LDL/HDL Ratio 3.9 (H) 0.0 - 3.6 ratio units   Hemoglobin A1c   Result Value Ref Range    Hemoglobin A1C 5.7 (H) 4.8 - 5.6 %   Uric acid   Result Value Ref Range    Uric Acid 6.9 3.7 - 8.6 mg/dL       Assessment/Plan   Diagnoses and all orders for this visit:    Left leg pain  -     CBC & Differential    Essential hypertension  -     Lipid Panel  -     Comprehensive Metabolic Panel    Chronic gout without tophus, unspecified cause, unspecified site  -     Uric Acid    Impaired glucose tolerance  -     Hemoglobin A1c    Need for pneumococcal vaccination  -     Pneumococcal Polysaccharide Vaccine 23-Valent Greater Than or Equal To 1yo Subcutaneous / IM    Need for hepatitis C screening test  -     Hepatitis C Antibody    Other orders  -     Cholecalciferol (VITAMIN D) 1000 UNITS tablet; Take 1,000 Units by mouth.      Discussion/Summary  Remy is here for his left ankle but is overdue for follow up on everything else.   I have ordered labs today to get him caught up.  He needs an AWV which I have asked him to come back for in 6 months.  Pneumovax today.  His left ankle issue seems to have resolved with the indomethacin.  His exam is completely benign.  Certianly, if his ankle or calf start bothering him again, I would like to see him for it when it's bothering him.    Return in about 6 months (around 8/13/2017) for Annual physical, with fasting labs prior.

## 2017-02-15 NOTE — TELEPHONE ENCOUNTER
Increasing dosage from 100mg daily to 200mg for allopurinol. Per Narciso - uric acid levels have increased.

## 2017-03-15 NOTE — TELEPHONE ENCOUNTER
----- Message from Dominique Stuart MD sent at 3/14/2017  1:03 PM EDT -----  Please call - his uric acid is much better.  It went from 8.3 to 5.7.  His kidney function also improved.

## 2017-03-31 PROBLEM — L60.8 NAIL PITTING: Status: ACTIVE | Noted: 2017-01-01

## 2017-03-31 PROBLEM — R60.9 EDEMA: Status: ACTIVE | Noted: 2017-01-01

## 2017-03-31 PROBLEM — D75.89 MACROCYTOSIS: Status: ACTIVE | Noted: 2017-01-01

## 2017-03-31 PROBLEM — D69.6 THROMBOCYTOPENIA (HCC): Status: ACTIVE | Noted: 2017-01-01

## 2017-03-31 PROBLEM — I87.2 VENOUS STASIS DERMATITIS OF BOTH LOWER EXTREMITIES: Status: ACTIVE | Noted: 2017-01-01

## 2017-03-31 PROBLEM — D64.9 SYMPTOMATIC ANEMIA: Status: ACTIVE | Noted: 2017-01-01

## 2017-03-31 PROBLEM — M25.472 LEFT ANKLE SWELLING: Status: ACTIVE | Noted: 2017-01-01

## 2017-03-31 PROBLEM — K12.1 ULCERATION, ORAL MUCOSA: Status: ACTIVE | Noted: 2017-01-01

## 2017-03-31 PROBLEM — R21 RASH: Status: ACTIVE | Noted: 2017-01-01

## 2017-03-31 PROBLEM — R50.9 FEVER CHILLS: Status: ACTIVE | Noted: 2017-01-01

## 2017-03-31 NOTE — PROGRESS NOTES
"Rash (x1week, Does not have an official diagnosis of psoriasis, but feels like that is what he has. ) and Nail Problem (Has a hole in his nail that popped up about [id number] ago. )      HPI  Remy Bryant is a 71 y.o. male RTC In acute care:   'I think I have developed psoriasis\".  Started on R 3rd finger with \"pinhole\" months ago and then \"ate up the nail\" over the next few months.  Developed rash on B ankles, chest and back about 8 days ago. Is itchy.  Started with joint pain in L ankle at \"same time\" and has stayed swollen.  Knee on L side started to hurt as well, but has not started to hurt.  \"It is hard to be mobile\".  Ankles is \"much worse\".  Started with \"fever\" within last week to 8 days. Wakes with no temp, but by time goes to bed has some mild chills and temp to ~99.9. Fatigue comes on most days and 'has to sit and rest and close eyes' by about 3-4 pm in afternoon.  Fatigue has been present for last 2 weeks or so.         Review of Systems   Constitution: Positive for chills, fever and malaise/fatigue.   HENT: Negative for congestion, hoarse voice, odynophagia and sore throat.         Had oral lesions about 6 weeks ago and dentist wanted pt seen by periodontist.   Not much dental work done recently. Had cleaning only.      Cardiovascular: Negative for chest pain and dyspnea on exertion.   Respiratory: Negative for cough and shortness of breath.    Skin: Positive for color change, itching, rash and suspicious lesions (R 3rd digit nail. \"Had a whole there, just draining pus\". ).   Musculoskeletal: Positive for joint pain (L ankle) and joint swelling. Negative for myalgias.   Gastrointestinal: Positive for abdominal pain (feels tight in throat for 30 seconds when lays down at night.). Negative for dysphagia.       The following portions of the patient's history were reviewed and updated as appropriate: allergies, current medications, past medical history and problem list.    No current facility-administered " "medications for this visit.     Current Outpatient Prescriptions:   •  allopurinol (ZYLOPRIM) 100 MG tablet, Take 2 tablets by mouth Daily., Disp: 60 tablet, Rfl: 1  •  Cholecalciferol (VITAMIN D) 1000 UNITS tablet, Take 1,000 Units by mouth., Disp: , Rfl:   •  indomethacin (INDOCIN) 50 MG capsule, Take 1 capsule by mouth 3 (Three) Times a Day As Needed for mild pain (1-3)., Disp: 15 capsule, Rfl: 0  •  losartan (COZAAR) 50 MG tablet, Take 1 tablet by mouth Daily. Indications: Was sent in via EHR, Disp: 90 tablet, Rfl: 3    Facility-Administered Medications Ordered in Other Visits:   •  Insert peripheral IV, , , Once **AND** sodium chloride 0.9 % flush 10 mL, 10 mL, Intravenous, PRN, Frankie Mckeon MD    Vitals:    03/31/17 1409   BP: 116/60   Pulse: 97   Resp: 18   Temp: 98.5 °F (36.9 °C)   SpO2: 98%   Weight: 255 lb (116 kg)   Height: 74\" (188 cm)         Physical Exam   Constitutional: He is oriented to person, place, and time. He appears well-developed and well-nourished. He does not have a sickly appearance. He does not appear ill. No distress.   HENT:   Head: Normocephalic and atraumatic.   Mouth/Throat: Uvula is midline and oropharynx is clear and moist. Mucous membranes are not dry. Oral lesions (ulcerations noted R lower gums at molars) present. No dental abscesses. No oropharyngeal exudate, posterior oropharyngeal edema or posterior oropharyngeal erythema.   Eyes: EOM are normal. Pupils are equal, round, and reactive to light.   Neck: Normal range of motion. Neck supple.   Cardiovascular: Normal rate, regular rhythm, normal heart sounds and intact distal pulses.  Exam reveals no gallop and no friction rub.    No murmur heard.  Pulses:       Carotid pulses are 2+ on the right side, and 2+ on the left side.       Radial pulses are 2+ on the right side, and 2+ on the left side.        Dorsalis pedis pulses are 2+ on the right side, and 2+ on the left side.   Pulmonary/Chest: Effort normal and breath " sounds normal. No respiratory distress. He has no wheezes. He has no rales.   Musculoskeletal: He exhibits edema (L > R ankle, pitting).        Right ankle: He exhibits swelling (mild). He exhibits normal range of motion, no ecchymosis and no deformity.        Left ankle: He exhibits swelling. He exhibits normal range of motion (pain with active ROM), no ecchymosis and no deformity.   Lymphadenopathy:     He has no cervical adenopathy.   Neurological: He is alert and oriented to person, place, and time. No cranial nerve deficit. He exhibits normal muscle tone. Gait normal.   Skin: Rash (scattered erythema papular rash with faint scale, non-confluent over central chest and rare lesions on abdomen) noted.        Psychiatric: He has a normal mood and affect. His behavior is normal.   Vitals reviewed.      Assessment/ Plan  Diagnoses and all orders for this visit:    Fever chills  -     CBC & Differential  -     Comprehensive Metabolic Panel  -     TSH  -     C-reactive Protein  -     Sedimentation Rate  -     C4+C3  -     Antinuclear Antibody With Reflex Cascade  -     RHEUMATOID FACTOR  -     Blood Culture  -     Uric Acid  -     CBC Auto Differential  -     Scan Slide; Future  -     Scan Slide  -     Manual Differential; Future  -     Manual Differential    Rash  -     CBC & Differential  -     Comprehensive Metabolic Panel  -     TSH  -     C-reactive Protein  -     Sedimentation Rate  -     C4+C3  -     Antinuclear Antibody With Reflex Cascade  -     RHEUMATOID FACTOR  -     Blood Culture  -     Uric Acid  -     CBC Auto Differential  -     Scan Slide; Future  -     Scan Slide  -     Manual Differential; Future  -     Manual Differential    Ulceration, oral mucosa  -     CBC & Differential  -     Comprehensive Metabolic Panel  -     TSH  -     C-reactive Protein  -     Sedimentation Rate  -     C4+C3  -     Antinuclear Antibody With Reflex Cascade  -     RHEUMATOID FACTOR  -     Blood Culture  -     Uric Acid  -      CBC Auto Differential  -     Scan Slide; Future  -     Scan Slide  -     Manual Differential; Future  -     Manual Differential    Left ankle swelling  -     CBC & Differential  -     Comprehensive Metabolic Panel  -     TSH  -     C-reactive Protein  -     Sedimentation Rate  -     C4+C3  -     Antinuclear Antibody With Reflex Cascade  -     RHEUMATOID FACTOR  -     Blood Culture  -     Uric Acid  -     CBC Auto Differential  -     Scan Slide; Future  -     Scan Slide  -     Manual Differential; Future  -     Manual Differential    Venous stasis dermatitis of both lower extremities  -     CBC & Differential  -     Comprehensive Metabolic Panel  -     TSH  -     C-reactive Protein  -     Sedimentation Rate  -     C4+C3  -     Antinuclear Antibody With Reflex Cascade  -     RHEUMATOID FACTOR  -     Blood Culture  -     Uric Acid  -     CBC Auto Differential  -     Scan Slide; Future  -     Scan Slide  -     Manual Differential; Future  -     Manual Differential    Nail pitting  -     CBC & Differential  -     Comprehensive Metabolic Panel  -     TSH  -     C-reactive Protein  -     Sedimentation Rate  -     C4+C3  -     Antinuclear Antibody With Reflex Cascade  -     RHEUMATOID FACTOR  -     Blood Culture  -     Uric Acid  -     CBC Auto Differential  -     Scan Slide; Future  -     Scan Slide  -     Manual Differential; Future  -     Manual Differential        Return for Next scheduled follow up.      Discussion:  Remy Bryant is a 71 y.o. male RTC in acute care with one month of R middle finger dystrophic nail after pus draining lesion noted, now resolve. Subsequent development of L > R ankle pain and swelling, low grade temp elevations/ chills, extreme episodic fatigue, oral ulcerations, and itchy rash over chest and back.  I d/w pt today that I could explain some issues with his current diagnoses (? Gout in L ankle) but taken as a whole I am really concerned about autoimmune process at this time.  Will send  above labs for eval, but will include blood cultures given recent dental issues and ?? Lesion draning pus last month on finger nail. Will f/u with pt after labs.   Addendum: Called by hospital lab at ~3:30pm with report of H/H 6.0 and 18.6.  Called pt at home and advised him to ED ASAP, preferably driven by someone. I called and spoke with ARLETTE Mcgee at ~3:40pm, reviewed case, and agreed to look for pt arrival in ED. Will follow inpt notes. `

## 2017-04-01 NOTE — PLAN OF CARE
Problem: Patient Care Overview (Adult)  Goal: Plan of Care Review  Outcome: Ongoing (interventions implemented as appropriate)    04/01/17 0130   Coping/Psychosocial Response Interventions   Plan Of Care Reviewed With patient   Patient Care Overview   Progress improving   Outcome Evaluation   Outcome Summary/Follow up Plan patient tolerating 2nd unit of blood; heme and gi to see today; will monitor labs       Goal: Adult Individualization and Mutuality  Outcome: Ongoing (interventions implemented as appropriate)  Goal: Discharge Needs Assessment  Outcome: Ongoing (interventions implemented as appropriate)    Problem: Fall Risk (Adult)  Goal: Identify Related Risk Factors and Signs and Symptoms  Outcome: Outcome(s) achieved Date Met:  04/01/17  Goal: Absence of Falls  Outcome: Ongoing (interventions implemented as appropriate)    Problem: Skin Integrity Impairment, Risk/Actual (Adult)  Goal: Identify Related Risk Factors and Signs and Symptoms  Outcome: Outcome(s) achieved Date Met:  04/01/17  Goal: Skin Integrity/Wound Healing  Outcome: Ongoing (interventions implemented as appropriate)

## 2017-04-01 NOTE — PLAN OF CARE
Problem: Patient Care Overview (Adult)  Goal: Plan of Care Review  Outcome: Ongoing (interventions implemented as appropriate)  Goal: Adult Individualization and Mutuality  Outcome: Ongoing (interventions implemented as appropriate)  Goal: Discharge Needs Assessment  Outcome: Ongoing (interventions implemented as appropriate)    Problem: Fall Risk (Adult)  Goal: Absence of Falls  Outcome: Ongoing (interventions implemented as appropriate)    Problem: Skin Integrity Impairment, Risk/Actual (Adult)  Goal: Skin Integrity/Wound Healing  Outcome: Ongoing (interventions implemented as appropriate)

## 2017-04-01 NOTE — PROGRESS NOTES
Chart follow-up.    Recent progress notes and labs noted.     Patient with diagnosis of acute marrow failure. Further heme/onc work-up planned.     Will cancel consult, sounds like there is no pressing need for GI input.       Nj Marti MD  4/1/2017  12:40 PM

## 2017-04-01 NOTE — H&P
Patient Care Team:  Dominique Stuart MD as PCP - General    Chief complaint anemia    Subjective     Pt is a 71 y.o. male who presents complaining of low hemoglobin per PCP visit earlier today.  The patient states that approximately 10 days ago he developed a rash on his lower extremities that was eczematous in appearance.  He was concerned that it may be psoriasis.  He had pitting of one nail approximately 2 months ago in this pitting has gotten better.    The patient approximately 2 months ago and worsening swelling in his left ankle and was concerned that he may be getting the gout again but did not have pain in the ankle.  He took indomethicin for just a short period of time.  He has been taking allopurinol now for at least a year.    The patient went to his primary care doctor's office today and was found to have a hemoglobin of 6 and was sent over to the emergency room.  In the emergency room his hemoglobin was 5.7.  The patient has been started on a blood trandfusion as I am seeing him.         Abnormal Lab   Pertinent negatives include no abdominal pain, arthralgias, chest pain or numbness.       Review of Systems   Constitutional: Negative for activity change and appetite change.   HENT: Negative for dental problem, postnasal drip and rhinorrhea.    Respiratory: Negative for apnea and shortness of breath.    Cardiovascular: Negative for chest pain and palpitations.   Gastrointestinal: Negative for abdominal distention and abdominal pain.   Endocrine: Negative for cold intolerance and polydipsia.   Genitourinary: Negative for difficulty urinating and frequency.   Musculoskeletal: Negative for arthralgias.   Neurological: Negative for dizziness and numbness.   Hematological: Negative for adenopathy.        Past Medical History:   Diagnosis Date   • Gout    • Hypertension    • IFG (impaired fasting glucose)    • Nephrolithiasis    • Obesity    • Polyp of sigmoid colon    • Prediabetes    • Renal  insufficiency syndrome      Past Surgical History:   Procedure Laterality Date   • APPENDECTOMY     • EXTRACORPOREAL SHOCK WAVE LITHOTRIPSY (ESWL)     • EYE SURGERY     • JOINT REPLACEMENT     • TOTAL HIP ARTHROPLASTY       Family History   Problem Relation Age of Onset   • Coronary artery disease Mother    • Stroke Mother    • Heart attack Father    • Asthma Sister    • Diabetes Paternal Grandmother      Social History   Substance Use Topics   • Smoking status: Never Smoker   • Smokeless tobacco: None   • Alcohol use Yes      Comment: cocktail once a month or week      Prescriptions Prior to Admission   Medication Sig Dispense Refill Last Dose   • allopurinol (ZYLOPRIM) 100 MG tablet Take 2 tablets by mouth Daily. 60 tablet 1 Taking   • losartan (COZAAR) 50 MG tablet Take 1 tablet by mouth Daily. Indications: Was sent in via EHR 90 tablet 3 Taking   • Cholecalciferol (VITAMIN D) 1000 UNITS tablet Take 1,000 Units by mouth Daily.   Taking     Allergies:  Review of patient's allergies indicates no known allergies.    Objective      Vital Signs  Temp:  [96.9 °F (36.1 °C)-98.7 °F (37.1 °C)] 98.7 °F (37.1 °C)  Heart Rate:  [] 72  Resp:  [16-20] 18  BP: (116-170)/(60-83) 118/83    Physical Exam   Constitutional: He appears well-developed and well-nourished. No distress.   HENT:   Head: Normocephalic and atraumatic.   Nose: Nose normal.   Mouth/Throat: Oropharynx is clear and moist. No oropharyngeal exudate.   Eyes: Conjunctivae and EOM are normal. No scleral icterus.   Neck: No JVD present. No tracheal deviation present. No thyromegaly present.   Cardiovascular: Normal rate, regular rhythm and normal heart sounds.  Exam reveals no gallop and no friction rub.    No murmur heard.  Pulmonary/Chest: Effort normal and breath sounds normal. No stridor. No respiratory distress. He has no wheezes. He has no rales.   Abdominal: Soft. Bowel sounds are normal. He exhibits no distension and no mass. There is no tenderness.  There is no rebound and no guarding.   Musculoskeletal: He exhibits no edema, tenderness or deformity.   Lymphadenopathy:     He has no cervical adenopathy.   Neurological: He is alert. No cranial nerve deficit.   Skin: Skin is warm and dry. No rash noted. He is not diaphoretic.   Psychiatric: He has a normal mood and affect. His behavior is normal.       Results Review:   I reviewed the patient's new clinical results.  Discussed with er md      Assessment/Plan     Principal Problem:    Symptomatic anemia  Active Problems:    Rash    Gout    Hypertension    Macrocytosis    Thrombocytopenia  blasts on peripheral smear-concern for leukemia    Assessment & Plan  Ferritin is high    Rectal in the er was negative    Mcv is very high and low platelets will get hematology opinion    Consider GI w/u    Bilirubin is normal so hemalytic process     Will send flow cytometry      Check venous doppler    So seems less likely that the patient has a GI bleed as he is heme-negative from below and his MCV is actually high instead of normal to low.  His iron levels are good and his ferritin levels also high.    As far as hemolytic processes the bilirubins normal go ahead and get an LDH in the morning but it seems less likely it's hemolytic.      The patient's platelets are also low so concerned that it could be bone marrow related.  We will get a B12, folate, TSH.  Will defer the decision for flow cytometry to hematology.      I discussed the patients findings and my recommendations with patient    Zeeshan Zapata MD  03/31/17  9:52 PM    Time:

## 2017-04-01 NOTE — PROGRESS NOTES
LOS: 1 day   Primary Care Physician: Dominique Stuart MD       Subjective  Called this monring for repeated low Hgb. Pt reports fatigue and migratory rash were only symptoms. He also has L foot welling but no pain or erythema. No CP SOA NVD. Reports he is leaving after doppler and repeated transfusion.  History taken from: patient       Physical Exam   Constitutional: He is oriented to person, place, and time. He appears well-developed and well-nourished. No distress.   HENT:   Head: Normocephalic and atraumatic.   Eyes: Conjunctivae and EOM are normal. Pupils are equal, round, and reactive to light.   Neck: Normal range of motion. Neck supple.   Cardiovascular: Normal rate, regular rhythm, normal heart sounds and intact distal pulses.    Pulmonary/Chest: Effort normal and breath sounds normal. He has no wheezes.   Abdominal: Soft. Bowel sounds are normal. He exhibits no distension. There is no tenderness.   Musculoskeletal: Normal range of motion.   LLE, foot has mild swelling, no edema. No erythema or TTP.   Neurological: He is alert and oriented to person, place, and time. No cranial nerve deficit.   Skin: Skin is warm and dry. He is not diaphoretic.   Psychiatric: He has a normal mood and affect. His behavior is normal.   Nursing note and vitals reviewed.      Vital Signs  Body mass index is 32.73 kg/(m^2).  Temp:  [96.9 °F (36.1 °C)-99.1 °F (37.3 °C)] 97.6 °F (36.4 °C)  Heart Rate:  [] 77  Resp:  [16-20] 20  BP: (110-170)/(47-83) 124/75      Results Review:     I reviewed the patient's new clinical results.      Results from last 7 days  Lab Units 04/01/17  0558 03/31/17  1732   WBC 10*3/mm3 4.51 6.20   HEMOGLOBIN g/dL 6.5* 5.7*   PLATELETS 10*3/mm3 73* 85*       Results from last 7 days  Lab Units 04/01/17  0558 03/31/17  1534   SODIUM mmol/L 140 139   POTASSIUM mmol/L 4.3 4.2   CHLORIDE mmol/L 103 100   TOTAL CO2 mmol/L 21.4* 22.8   BUN mg/dL 17 16   CREATININE mg/dL 1.24 1.25   CALCIUM mg/dL 8.8 9.4    GLUCOSE mg/dL 104* 95         Hemoglobin A1C:  Lab Results   Component Value Date    HGBA1C 6.10 (H) 02/13/2017       Glucose Range:No results found for: POCGLU    Medication Review: Yes    Physical Therapy:    Assessment/Plan     Active Hospital Problems (** Indicates Principal Problem)    Diagnosis Date Noted   • **Symptomatic anemia [D64.9] 03/31/2017   • Rash [R21] 03/31/2017   • Macrocytosis [D75.89] 03/31/2017   • Thrombocytopenia [D69.6] 03/31/2017   • Edema [R60.9] 03/31/2017   • Gout [M10.9] 06/15/2016   • Hypertension [I10] 06/15/2016      Resolved Hospital Problems    Diagnosis Date Noted Date Resolved   No resolved problems to display.       Assessment & Plan  -Myelodysplastic Syndrome vs AML per Oncology note. Ok to discharge after additional 2 units PRBC if repeat Hgb>8. Follow up with Dr Sutton at [facility].  -L foot swelling with US ordered. Will follow up results. Pt agreed to stay for imaging. B12>300.  -Rash with hemotologic origin most likely. No fevers to suggest sweet syndrome at this point. Will monitor.    Dispo: Possible dc today depending on repeat hgb and doppler results. Will need BMBx and further workup at .    Brendan Kraus MD  04/01/17  12:52 PM    Time: 45min

## 2017-04-01 NOTE — CONSULTS
Subjective .     REASON FOR CONSULTATION:   anemia  Provide an opinion on any further workup or treatment                             REQUESTING PHYSICIAN: Zeeshan Zapata MD  RECORDS OBTAINED:  Records of the patients history including those obtained from the referring provider were reviewed and summarized in detail.    HISTORY OF PRESENT ILLNESS:  The patient is a 71 y.o. year old male  who is here for follow-up with the above-mentioned history.    At PCP office, Hb 6.  Since the emergency room with repeat HB 5.7.  Rash on legs 10 days ago.  He thought this may be psoriasis.  Pinning of one nail 2 months ago which improved.  Swelling of left ankle 2 months ago without pain.  Patient treated with indomethacin as he has a history of gout and thought this may being gout.  Has been on allopurinol for over 1 year.  On 6/1/15, CBC normal.  WBC 5.1, Hb 14.5, .  Differential unremarkable.  On 3/31/17, WBC 7.4, Hb 6, PLT 98.  Blasts 3%  1 4/1/17, WBC 4.5, Hb 6.5, PLT 73.    Subjective fevers x 1 wk; no night sweats or weight loss.  Denies bleeding.  Denies chest pain, shortness of breath, dizziness.  Gradually worsening fatigue times 1-2 months.    Works out at the [employer]  3 times per week.  Golfs regularly.      Past Medical History:   Diagnosis Date   • Gout    • Hypertension    • IFG (impaired fasting glucose)    • Nephrolithiasis    • Obesity    • Polyp of sigmoid colon    • Prediabetes    • Renal insufficiency syndrome      Past Surgical History:   Procedure Laterality Date   • APPENDECTOMY     • EXTRACORPOREAL SHOCK WAVE LITHOTRIPSY (ESWL)     • EYE SURGERY     • JOINT REPLACEMENT     • TOTAL HIP ARTHROPLASTY         HEMATOLOGIC/ONCOLOGIC HISTORY:  (History from previous dates can be found in the separate document.)    MEDICATIONS    Current Facility-Administered Medications:   •  acetaminophen (TYLENOL) tablet 650 mg, 650 mg, Oral, Q4H PRN, Zeeshan Zapata MD  •  allopurinol (ZYLOPRIM) tablet 100 mg, 100 mg,  Oral, Daily, Zeeshan Zapata MD  •  cholecalciferol (VITAMIN D3) tablet 1,000 Units, 1,000 Units, Oral, Daily, Zeeshan Zapata MD  •  losartan (COZAAR) tablet 50 mg, 50 mg, Oral, Daily, Zeeshan Zapata MD  •  ondansetron (ZOFRAN) tablet 4 mg, 4 mg, Oral, Q6H PRN **OR** ondansetron ODT (ZOFRAN-ODT) disintegrating tablet 4 mg, 4 mg, Oral, Q6H PRN **OR** ondansetron (ZOFRAN) injection 4 mg, 4 mg, Intravenous, Q6H PRN, Zeeshan Zapata MD  •  pantoprazole (PROTONIX) injection 40 mg, 40 mg, Intravenous, Q12H, Zeeshan Zapata MD, 40 mg at 04/01/17 0322  •  sodium chloride 0.9 % flush 1-10 mL, 1-10 mL, Intravenous, PRN, Zeeshan Zapata MD  •  Insert peripheral IV, , , Once **AND** sodium chloride 0.9 % flush 10 mL, 10 mL, Intravenous, PRN, Frankie Mckeon MD    ALLERGIES:   No Known Allergies    SOCIAL HISTORY:       Social History     Social History   • Marital status:      Spouse name: N/A   • Number of children: N/A   • Years of education: N/A     Occupational History   • Not on file.     Social History Main Topics   • Smoking status: Never Smoker   • Smokeless tobacco: Not on file   • Alcohol use Yes      Comment: cocktail once a month or week    • Drug use: No   • Sexual activity: Not on file     Other Topics Concern   • Not on file     Social History Narrative         FAMILY HISTORY:  Family History   Problem Relation Age of Onset   • Coronary artery disease Mother    • Stroke Mother    • Heart attack Father    • Asthma Sister    • Diabetes Paternal Grandmother        REVIEW OF SYSTEMS:  GENERAL: No change in appetite or weight;   No fevers, chills, sweats.    SKIN: No nonhealing lesions.   No rashes.  HEME/LYMPH: No easy bruising, bleeding.   No swollen nodes.   EYES: No vision changes or diplopia.   ENT: No tinnitus, hearing loss, gum bleeding, epistaxis, hoarseness or dysphagia.   RESPIRATORY: No cough, shortness of breath, hemoptysis or wheezing.   CVS: No chest pain, palpitations, orthopnea,  dyspnea on exertion or PND.   GI: No melena or hematochezia.   No abdominal pain.  No nausea, vomiting, constipation, diarrhea  : No lower tract obstructive symptoms, dysuria or hematuria.   MUSCULOSKELETAL: No bone pain.  No joint stiffness.   NEUROLOGICAL: No global weakness, loss of consciousness or seizures.   PSYCHIATRIC: No increased nervousness, mood changes or depression.     Objective    Vitals:    03/31/17 2206 04/01/17 0024 04/01/17 0059 04/01/17 0322   BP: 114/68 119/63 113/47 110/58   BP Location:       Patient Position:       Pulse: 75 82 80 75   Resp: 18 18 18 18   Temp: 99 °F (37.2 °C) 99.1 °F (37.3 °C) 99.1 °F (37.3 °C) 98.9 °F (37.2 °C)   TempSrc: Oral Oral Oral Oral   SpO2: 98% 97% 96% 96%   Weight:       Height:         No flowsheet data found.   PHYSICAL EXAM:    GENERAL:  Well-developed, well-nourished in no acute distress.   SKIN:  Warm, dry without rashes, purpura or petechiae.  EYES:  Pupils equal, round and reactive to light.  EOMs intact.  Conjunctivae normal.  EARS:  Hearing intact.  NOSE:  Septum midline.  No excoriations or nasal discharge.  MOUTH:  Tongue is well-papillated; no stomatitis or ulcers.  Lips normal.  THROAT:  Oropharynx without lesions or exudates.  NECK:  Supple with good range of motion; no thyromegaly or masses, no JVD.  LYMPHATICS:  No cervical, supraclavicular, axillary or inguinal adenopathy.  CHEST:  Lungs clear to auscultation. Good airflow.  CARDIAC:  Regular rate and rhythm without murmurs, rubs or gallops. Normal S1,S2.  ABDOMEN:  Soft, nontender with no hepatosplenomegaly or masses.  EXTREMITIES:  No clubbing, cyanosis or edema.  NEUROLOGICAL:  Cranial Nerves II-XII grossly intact.  No focal neurological deficits.  PSYCHIATRIC:  Normal affect and mood.    RECENT LABS:        WBC   Date Value Ref Range Status   04/01/2017 4.51 4.50 - 10.70 10*3/mm3 Final   03/31/2017 6.20 4.50 - 10.70 10*3/mm3 Final   03/31/2017 7.40 4.50 - 10.70 10*3/mm3 Final     Hemoglobin    Date Value Ref Range Status   04/01/2017 6.5 (C) 13.7 - 17.6 g/dL Final   03/31/2017 5.7 (C) 13.7 - 17.6 g/dL Final   03/31/2017 6.0 (C) 13.7 - 17.6 g/dL Final     Platelets   Date Value Ref Range Status   04/01/2017 73 (L) 140 - 500 10*3/mm3 Final   03/31/2017 85 (L) 140 - 500 10*3/mm3 Final   03/31/2017 98 (L) 140 - 500 10*3/mm3 Final     Unremarkable: Iron labs, folate, TSH, B12.    Assessment/Plan   1.  Macrocytic significant anemia.  Hb improved from 5.7 to 6.5 after 2 units.  2 more units ordered today.  LDH high (which may be due to marrow disorder instead of hemolysis), total bili normal - suggesting no hemolysis.  I added haptoglobin and zelda.    2.  Thrombocytopenia.  3.  Circulating blasts.    I think the most likely diagnosis to tie this together is myelodysplastic syndrome, perhaps that has evolved into acute myeloid leukemia.  Agree with 2 units packed red blood cells that have been ordered.    I suspect he will need a reduced intensity allogeneic bone marrow transplant.  He would like to have this locally at the [facility].  If we do a bone marrow biopsy here, they will likely put him through a second procedure there as well.  Therefore, I think it is reasonable for him to see them in the office and they can perform the bone marrow biopsy.  He will receive all treatment and followup through [facility].  No followup in our office.  If hemoglobin rises above 8 after transfusion, I think discharge would be reasonable as long as he follows up at the [facility] this week.  (Patient very much wants to go home as soon as possible)    I briefly discussed this case with Dr. Frankie Sutton from [facility].  He plans to discuss more extensively with me, but he states the patient should be able to followup with someone from their office this week.

## 2017-04-01 NOTE — ED NOTES
"Pt blood consent signed.  Informed patient blood is ready for transfusion , but patient wants to go to inpatient room prior to having blood started.  Report called to floor.  Informed Tahira HOPE.  Pt up in chair requesting to walk to room.  Informed patient it would be best to ride in wheelchair.  Verbalizes understanding.  Denies any complaints of pain.  States at times he just \"feels off\" .  States he would have \"gone home and gone about my business if Dr larose sent me here for my hemoglobin     Dora Castaneda RN  03/31/17 2049    "

## 2017-04-01 NOTE — DISCHARGE SUMMARY
Date of Admission: 3/31/2017  Date of Discharge:  4/1/2017  Primary Care Physician: Dominique Stuart MD     Discharge Diagnosis:  Active Hospital Problems (** Indicates Principal Problem)    Diagnosis Date Noted   • **Symptomatic anemia [D64.9] 03/31/2017   • Rash [R21] 03/31/2017   • Macrocytosis [D75.89] 03/31/2017   • Thrombocytopenia [D69.6] 03/31/2017   • Edema [R60.9] 03/31/2017   • Gout [M10.9] 06/15/2016   • Hypertension [I10] 06/15/2016      Resolved Hospital Problems    Diagnosis Date Noted Date Resolved   No resolved problems to display.       Presenting Problem/History of Present Illness  Symptomatic anemia [D64.9]   Pt is a 71 y.o. male who presents complaining of low hemoglobin per PCP visit earlier today.  The patient states that approximately 10 days ago he developed a rash on his lower extremities that was eczematous in appearance. He was concerned that it may be psoriasis. He had pitting of one nail approximately 2 months ago in this pitting has gotten better.     The patient approximately 2 months ago and worsening swelling in his left ankle and was concerned that he may be getting the gout again but did not have pain in the ankle. He took indomethicin for just a short period of time.  He has been taking allopurinol now for at least a year.     The patient went to his primary care doctor's office today and was found to have a hemoglobin of 6 and was sent over to the emergency room. In the emergency room his hemoglobin was 5.7. The patient has been started on a blood trandfusion as I am seeing him.            Abnormal Lab   Pertinent negatives include no abdominal pain, arthralgias, chest pain or numbness.     Hospital Course  Patient is a 71 y.o. male who presented with  Hgb of 5.7 and symptomatic anemia. He had fatigue and rash noted. Hematology/Oncology were consulted. He has macrocytic anemia with circulating blasts and thrombocytopenia. He revcieved trasnfusion with increase of Hgb to 6.5.  B12 was >300 and folate was normal. Hematology suspects myelodysplastic syndrome and possible conversion to AML. He was anxious to leave and at one point wished to leave A. We discussed and he agreed to stay for additional 2 units PRBC transfusions. He will ultimately require bone marrow biopsy and possibly reduced intensity allogenic bone marrow transplant. Hematology discussed this with Dr Sutton at [facility]. If his repeat labs after transfusion show hgb >8.0, then he can be discharged later on today with plan for him to see Dr Sutton in clinic this week for biopsy, labs, and additional management.    Procedures Performed:         Consults:   Consults     Date and Time Order Name Status Description    3/31/2017 2153 Inpatient Consult to Hematology & Oncology Completed     3/31/2017 1833 [facility] (on-call MD unless specified) Completed              Condition on Discharge: Fair    Discharge Disposition  Home or Self Care    Discharge Medications:   Remy Bryant   Home Medication Instructions TYRELL:[id number]    Printed on:04/01/17 2275   Medication Information                      allopurinol (ZYLOPRIM) 100 MG tablet  Take 2 tablets by mouth Daily.             Cholecalciferol (VITAMIN D) 1000 UNITS tablet  Take 1,000 Units by mouth Daily.             losartan (COZAAR) 50 MG tablet  Take 1 tablet by mouth Daily. Indications: Was sent in via EHR                 Discharge Diet:   Diet Instructions     Advance Diet As Tolerated                     Activity at Discharge:   Activity Instructions     Activity as Tolerated                     Follow-up Appointments:  Future Appointments  Date Time Provider Department Center   4/17/2017 10:30 AM Dominique Stuart MD [facility] None   9/1/2017 9:30 AM [facility] None   9/6/2017 11:00 AM Dominique Stuart MD [facility] None     Additional Instructions for the Follow-ups that You Need to Schedule     Discharge Follow-up with PCP    As directed    Follow Up  Details:  1-2 weeks       Discharge Follow-up with Specialty    As directed    Specialty:  Oncology, Dr Sutton at [facility]   Follow Up:  1 Week   Follow Up Details:  follow up this week                 Test Results Pending at Discharge   Order Current Status    Haptoglobin In process           Brendan Kraus MD  04/01/17  3:09 PM    Time Spent on Discharge Activities: >30min

## 2017-04-03 NOTE — TELEPHONE ENCOUNTER
Patient was in the office last week. He states his ankle is still swollen and he wants to know why that might be.

## 2017-04-03 NOTE — TELEPHONE ENCOUNTER
Unclear if related to current issue or not. 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  Will have pt complete U/S of leg to r/o DVT.

## 2017-04-14 NOTE — TELEPHONE ENCOUNTER
"Pt called and stated that he is upset that  never informed him that his hemoglobin was low.     Pt stated that he had lab work done for  in January, February and March, believes that he had hemoglobin results and never told him.     Informed pt that there is not an OV or lab encounter for any labs from January, the ones from February had a CBC, but the result said cancelled. It was cancelled on the lab ct end, I informed the pt that usually means there wasn't enough blood (have contacted [facility] to try to see why cbc was cancelled).     [facility] has no documentation besides \"unable to obtain a suitable specimen for the following test, and is providing the patient with recollection instructions\".   Lab ct stated that they are the ones that try to reach out to the patient for recollection, but unsure if that happened. I am unaware if the pt was contacted by [facility] or not. Also suppose to contact our lab in our office to tell them that the lab was cancelled and why, lab workers deny receiving a phone call or fax with that information.     Lab ct is suppose to be looking into why the labs were cancelled and call me back with the information.    The march labs were just follow up labs for the allopurinol dose change, which only required a Uric acid and BMP. Informed the pt that we do not order extra labs to avoid the pt having to pay out of pocket for unnecessary labs. Pt stated he wished we would have, because it would have saved him a lot of money. I apologized for him being upset with our office and for feeling that way. Pt then hung up.     --Narciso informed--Pt has apt with  on Monday the 17th.   "

## 2017-04-17 NOTE — TELEPHONE ENCOUNTER
Pt cancelled apt and stated that he is going to move to the [facility] for atleast 5 weeks, needs to take today to get things in order. Cannot spare the time to come in, informed to call back and come in at his convenience so we can stay informed and f/u with his condition as well. Told if he needs anything from us to just let us know.